# Patient Record
Sex: FEMALE | Race: BLACK OR AFRICAN AMERICAN | Employment: FULL TIME | URBAN - METROPOLITAN AREA
[De-identification: names, ages, dates, MRNs, and addresses within clinical notes are randomized per-mention and may not be internally consistent; named-entity substitution may affect disease eponyms.]

---

## 2022-03-01 ENCOUNTER — HOSPITAL ENCOUNTER (EMERGENCY)
Age: 37
Discharge: HOME | End: 2022-03-01
Attending: EMERGENCY MEDICINE
Payer: MEDICAID

## 2022-03-01 VITALS
HEART RATE: 88 BPM | WEIGHT: 285 LBS | BODY MASS INDEX: 53.81 KG/M2 | RESPIRATION RATE: 16 BRPM | OXYGEN SATURATION: 100 % | DIASTOLIC BLOOD PRESSURE: 103 MMHG | TEMPERATURE: 98 F | HEIGHT: 61 IN | SYSTOLIC BLOOD PRESSURE: 140 MMHG

## 2022-03-01 DIAGNOSIS — I10 HYPERTENSION, UNSPECIFIED TYPE: ICD-10-CM

## 2022-03-01 DIAGNOSIS — N89.8 VAGINAL MASS: ICD-10-CM

## 2022-03-01 DIAGNOSIS — N76.0 BV (BACTERIAL VAGINOSIS): Primary | ICD-10-CM

## 2022-03-01 DIAGNOSIS — Z20.2 STD EXPOSURE: ICD-10-CM

## 2022-03-01 DIAGNOSIS — B96.89 BV (BACTERIAL VAGINOSIS): Primary | ICD-10-CM

## 2022-03-01 LAB
APPEARANCE UR: ABNORMAL
BACTERIA URNS QL MICRO: ABNORMAL /HPF
BILIRUB UR QL: NEGATIVE
COLOR UR: YELLOW
EPITH CASTS URNS QL MICRO: ABNORMAL /LPF (ref 0–5)
GLUCOSE UR STRIP.AUTO-MCNC: NEGATIVE MG/DL
HCG UR QL: NEGATIVE
HGB UR QL STRIP: ABNORMAL
KETONES UR QL STRIP.AUTO: 15 MG/DL
LEUKOCYTE ESTERASE UR QL STRIP.AUTO: ABNORMAL
MUCOUS THREADS URNS QL MICRO: ABNORMAL /LPF
NITRITE UR QL STRIP.AUTO: NEGATIVE
PH UR STRIP: 6 [PH] (ref 5–8)
PROT UR STRIP-MCNC: 30 MG/DL
RBC #/AREA URNS HPF: ABNORMAL /HPF (ref 0–5)
SERVICE CMNT-IMP: NORMAL
SP GR UR REFRACTOMETRY: 1.03 (ref 1–1.03)
UROBILINOGEN UR QL STRIP.AUTO: 1 EU/DL (ref 0.2–1)
WBC URNS QL MICRO: ABNORMAL /HPF (ref 0–5)
WET PREP GENITAL: NORMAL

## 2022-03-01 PROCEDURE — 87210 SMEAR WET MOUNT SALINE/INK: CPT

## 2022-03-01 PROCEDURE — 74011000250 HC RX REV CODE- 250: Performed by: PHYSICIAN ASSISTANT

## 2022-03-01 PROCEDURE — 81001 URINALYSIS AUTO W/SCOPE: CPT

## 2022-03-01 PROCEDURE — 99284 EMERGENCY DEPT VISIT MOD MDM: CPT

## 2022-03-01 PROCEDURE — 96372 THER/PROPH/DIAG INJ SC/IM: CPT

## 2022-03-01 PROCEDURE — 74011250636 HC RX REV CODE- 250/636: Performed by: PHYSICIAN ASSISTANT

## 2022-03-01 PROCEDURE — 87491 CHLMYD TRACH DNA AMP PROBE: CPT

## 2022-03-01 PROCEDURE — 81025 URINE PREGNANCY TEST: CPT

## 2022-03-01 RX ORDER — METRONIDAZOLE 500 MG/1
500 TABLET ORAL 2 TIMES DAILY
Qty: 14 TABLET | Refills: 0 | Status: SHIPPED | OUTPATIENT
Start: 2022-03-01 | End: 2022-03-01 | Stop reason: SDUPTHER

## 2022-03-01 RX ORDER — DOXYCYCLINE HYCLATE 100 MG
100 TABLET ORAL 2 TIMES DAILY
Qty: 14 TABLET | Refills: 0 | Status: SHIPPED | OUTPATIENT
Start: 2022-03-01 | End: 2022-03-08

## 2022-03-01 RX ORDER — METRONIDAZOLE 500 MG/1
500 TABLET ORAL 2 TIMES DAILY
Qty: 14 TABLET | Refills: 0 | Status: SHIPPED | OUTPATIENT
Start: 2022-03-01 | End: 2022-03-08

## 2022-03-01 RX ORDER — DOXYCYCLINE HYCLATE 100 MG
100 TABLET ORAL 2 TIMES DAILY
Qty: 14 TABLET | Refills: 0 | Status: SHIPPED | OUTPATIENT
Start: 2022-03-01 | End: 2022-03-01 | Stop reason: SDUPTHER

## 2022-03-01 RX ADMIN — LIDOCAINE HYDROCHLORIDE 500 MG: 10 INJECTION, SOLUTION EPIDURAL; INFILTRATION; INTRACAUDAL; PERINEURAL at 17:35

## 2022-03-01 NOTE — DISCHARGE INSTRUCTIONS
Your test was positive for bacterial vaginosis. Take the flagyl as directed until gone. We have placed a consult for you to our  to assist you in seeing someone regarding your blood pressure and following up on the soft tissue mass     You were treated for exposure to a possible STD. No sexual activity for the next 2 weeks. Any sexual partner(s) should be checked for STD's as well. You should follow-up with the [facility] Department for any further testing for STDs, including HIV.     Address: [address]  Telephone: [phone]

## 2022-03-01 NOTE — Clinical Note
[facility]  [facility] EMERGENCY DEPT  [address] [facility] [address]  [id number] [phone]    Work/School Note    Date: 3/1/2022    To Whom It May concern:    Verline Nageotte was seen and treated today in the emergency room by the following provider(s):  Attending Provider: Binu Rios MD  Physician Assistant: Venkatesh Jackson PA-C. Verline Nageotte is excused from work/school on 03/01/22 and 03/02/22. She is medically clear to return to work/school on 3/3/2022.        Sincerely,          Rosalba Carrillo PA-C

## 2022-03-01 NOTE — ED PROVIDER NOTES
EMERGENCY DEPARTMENT HISTORY AND PHYSICAL EXAM    Date: 3/1/2022  Patient Name: Antonio Watt    History of Presenting Illness     Chief Complaint   Patient presents with    Exposure to STD         History Provided By: Patient     Chief Complaint:wants to be checked for STD  Duration:   Timing:    Location:   Quality:   Severity:   Modifying Factors:   Associated Symptoms: none       Additional History (Context): Antonio Watt is a 39 y.o. female with a history of hypertension not on any treatment presents for evaluation of STD check. Recent sexual partners having symptoms of penile pain and discharge, has had recent sexual encounter with him. Is not having any symptoms at this time, no discharge, painful urination or painful sex. No abdominal pain. Has history of multiple episodes of bacterial vaginosis and trichomonas, no other STD history. Does not have primary care provider. Also notes that she has possible cyst in her vaginal area she wanted to have looked at. PCP: None    Current Outpatient Medications   Medication Sig Dispense Refill    doxycycline (VIBRA-TABS) 100 mg tablet Take 1 Tablet by mouth two (2) times a day for 7 days. 14 Tablet 0    metroNIDAZOLE (FlagyL) 500 mg tablet Take 1 Tablet by mouth two (2) times a day for 7 days. 14 Tablet 0       Past History     Past Medical History:  Past Medical History:   Diagnosis Date    Hypertension        Past Surgical History:  History reviewed. No pertinent surgical history. Family History:  History reviewed. No pertinent family history. Social History:  Social History     Tobacco Use    Smoking status: Never Smoker    Smokeless tobacco: Not on file   Vaping Use    Vaping Use: Not on file   Substance Use Topics    Alcohol use: Not Currently    Drug use: Never       Allergies:  No Known Allergies      Review of Systems   Review of Systems   Constitutional: Negative for chills, fatigue and fever.    Gastrointestinal: Negative for abdominal pain, nausea and vomiting. Genitourinary: Positive for genital sores. Negative for decreased urine volume, dyspareunia, dysuria, flank pain, frequency, hematuria, menstrual problem, pelvic pain, vaginal bleeding, vaginal discharge and vaginal pain. Skin: Negative. All Other Systems Negative  Physical Exam     Vitals:    03/01/22 1651   BP: (!) 140/103   Pulse: 95   Resp: 14   Temp: 98 °F (36.7 °C)   SpO2: 100%   Weight: 129.3 kg (285 lb)   Height: 5' 1\" (1.549 m)     Physical Exam  Exam conducted with a chaperone present (OLVIN Yeager). Constitutional:       General: She is not in acute distress. Appearance: Normal appearance. She is not ill-appearing or toxic-appearing. HENT:      Head: Normocephalic. Cardiovascular:      Rate and Rhythm: Normal rate and regular rhythm. Pulses: Normal pulses. Heart sounds: Normal heart sounds. Pulmonary:      Effort: Pulmonary effort is normal.      Breath sounds: Normal breath sounds. Genitourinary:     Exam position: Lithotomy position. Labia:         Right: No rash, tenderness, lesion or injury. Left: No rash, tenderness, lesion or injury. Vagina: Normal.      Cervix: No cervical motion tenderness, discharge, friability, lesion, erythema or cervical bleeding. Comments: Collected wet prep  Skin:     General: Skin is warm and dry. Neurological:      General: No focal deficit present. Mental Status: She is alert and oriented to person, place, and time.    Psychiatric:         Mood and Affect: Mood normal.         Behavior: Behavior normal.           Diagnostic Study Results     Labs -     Recent Results (from the past 12 hour(s))   URINALYSIS W/ RFLX MICROSCOPIC    Collection Time: 03/01/22  5:00 PM   Result Value Ref Range    Color YELLOW      Appearance CLOUDY      Specific gravity 1.027 1.005 - 1.030      pH (UA) 6.0 5.0 - 8.0      Protein 30 (A) NEG mg/dL    Glucose Negative NEG mg/dL    Ketone 15 (A) NEG mg/dL    Bilirubin Negative NEG      Blood SMALL (A) NEG      Urobilinogen 1.0 0.2 - 1.0 EU/dL    Nitrites Negative NEG      Leukocyte Esterase MODERATE (A) NEG     URINE MICROSCOPIC ONLY    Collection Time: 03/01/22  5:00 PM   Result Value Ref Range    WBC 21 to 35 0 - 5 /hpf    RBC 11 to 20 0 - 5 /hpf    Epithelial cells 3+ 0 - 5 /lpf    Bacteria 3+ (A) NEG /hpf    Mucus 2+ (A) NEG /lpf   HCG URINE, QL. - POC    Collection Time: 03/01/22  5:13 PM   Result Value Ref Range    Pregnancy test,urine (POC) Negative NEG     WET PREP    Collection Time: 03/01/22  5:25 PM    Specimen: Genital specimen   Result Value Ref Range    Special Requests: NO SPECIAL REQUESTS      Wet prep CLUE CELLS PRESENT      Wet prep NO TRICHOMONAS SEEN      Wet prep NO YEAST SEEN         Radiologic Studies -   No orders to display     CT Results  (Last 48 hours)    None        CXR Results  (Last 48 hours)    None            Medical Decision Making   I am the first provider for this patient. I reviewed the vital signs, available nursing notes, past medical history, past surgical history, family history and social history. Vital Signs-Reviewed the patient's vital signs. Records Reviewed: Nursing Notes and Old Medical Records     Procedures: None   Procedures    Provider Notes (Medical Decision Making):     Return for STD, bacterial vaginosis, urinary tract infection, abscess, cyst    Incidental finding of mobile, skin colored mass on the left . Is nontender, does not appear inflamed or infected. States has been there for quite some time, does not hurt at all or causing issues, she just notices it when she washes herself. Has not had pelvic exam or Pap in some time. Patient was set up with  to get established with primary care provider to follow-up on her blood pressure, also recommend that she follow-up on this mass. Recommend treatment for STD today, will send for wet prep, GC chlamydia and trichomoniasis.   No cervical motion tenderness on exam.  No other abnormalities on pelvic exam.    Positive for bacterial vaginosis. Start on flagyl and doxy for prophylaxis of chlamydia. Rocephin given in the ED. Will contact with results when received. Discussed need for follow up on her BP and the incidental finding of mass on her vaginal area. Return to the ER if any worsening symptoms. Will set up with  to help get PCP established. MED RECONCILIATION:  No current facility-administered medications for this encounter. Current Outpatient Medications   Medication Sig    doxycycline (VIBRA-TABS) 100 mg tablet Take 1 Tablet by mouth two (2) times a day for 7 days.  metroNIDAZOLE (FlagyL) 500 mg tablet Take 1 Tablet by mouth two (2) times a day for 7 days. Disposition:  Home     DISCHARGE NOTE:   Pt has been reexamined. Patient has no new complaints, changes, or physical findings. Care plan outlined and precautions discussed. Results of workup were reviewed with the patient. All medications were reviewed with the patient. All of pt's questions and concerns were addressed. Patient was instructed and agrees to follow up with PCP as well as to return to the ED upon further deterioration. Patient is ready to go home. Follow-up Information     Follow up With Specialties Details Why [facility]    [facility] EMERGENCY DEPT Emergency Medicine  If symptoms worsen [address] [place] [address] [phone]    Benny Evans MD Obstetrics & Gynecology, Gynecology, Obstetrics Schedule an appointment as soon as possible for a visit   [address] [place] [address]  [phone]            Current Discharge Medication List      START taking these medications    Details   doxycycline (VIBRA-TABS) 100 mg tablet Take 1 Tablet by mouth two (2) times a day for 7 days.   Qty: 14 Tablet, Refills: 0  Start date: 3/1/2022, End date: 3/8/2022      metroNIDAZOLE (FlagyL) 500 mg tablet Take 1 Tablet by mouth two (2) times a day for 7 days. Qty: 14 Tablet, Refills: 0  Start date: 3/1/2022, End date: 3/8/2022                 Diagnosis     Clinical Impression:   1. BV (bacterial vaginosis)    2. STD exposure    3. Vaginal mass    4. Hypertension, unspecified type          \"Please note that this dictation was completed with [employer], the computer voice recognition software. Quite often unanticipated grammatical, syntax, homophones, and other interpretive errors are inadvertently transcribed by the computer software. Please disregard these errors. Please excuse any errors that have escaped final proofreading. \"

## 2022-03-01 NOTE — ED TRIAGE NOTES
Patient reports she might have a vaginal infection.  Reports her partner has sympotms so she wants to be checked

## 2022-03-02 LAB
C TRACH RRNA SPEC QL NAA+PROBE: NEGATIVE
N GONORRHOEA RRNA SPEC QL NAA+PROBE: NEGATIVE
SPECIMEN SOURCE: NORMAL